# Patient Record
Sex: MALE | Race: BLACK OR AFRICAN AMERICAN | Employment: UNEMPLOYED | URBAN - METROPOLITAN AREA
[De-identification: names, ages, dates, MRNs, and addresses within clinical notes are randomized per-mention and may not be internally consistent; named-entity substitution may affect disease eponyms.]

---

## 2024-04-02 ENCOUNTER — HOSPITAL ENCOUNTER (EMERGENCY)
Age: 29
Discharge: HOME | End: 2024-04-02

## 2024-04-02 VITALS
HEART RATE: 75 BPM | DIASTOLIC BLOOD PRESSURE: 78 MMHG | RESPIRATION RATE: 16 BRPM | WEIGHT: 143.96 LBS | OXYGEN SATURATION: 96 % | SYSTOLIC BLOOD PRESSURE: 106 MMHG | TEMPERATURE: 98 F

## 2024-04-02 DIAGNOSIS — T16.2XXA FOREIGN BODY OF LEFT EAR, INITIAL ENCOUNTER: Primary | ICD-10-CM

## 2024-04-02 PROCEDURE — 99283 EMERGENCY DEPT VISIT LOW MDM: CPT

## 2024-04-02 PROCEDURE — 69200 CLEAR OUTER EAR CANAL: CPT

## 2024-04-02 RX ORDER — OFLOXACIN 3 MG/ML
5 SOLUTION AURICULAR (OTIC) 2 TIMES DAILY
Qty: 5 ML | Refills: 0 | Status: SHIPPED | OUTPATIENT
Start: 2024-04-02 | End: 2024-04-12

## 2024-04-02 RX ORDER — CIPROFLOXACIN AND DEXAMETHASONE 3; 1 MG/ML; MG/ML
4 SUSPENSION/ DROPS AURICULAR (OTIC) 2 TIMES DAILY
Qty: 7.5 ML | Refills: 0 | Status: SHIPPED | OUTPATIENT
Start: 2024-04-02 | End: 2024-04-02 | Stop reason: ALTCHOICE

## 2024-04-02 ASSESSMENT — PAIN SCALES - GENERAL
PAINLEVEL_OUTOF10: 0
PAINLEVEL_OUTOF10: 0

## 2024-04-02 ASSESSMENT — PAIN - FUNCTIONAL ASSESSMENT
PAIN_FUNCTIONAL_ASSESSMENT: NONE - DENIES PAIN
PAIN_FUNCTIONAL_ASSESSMENT: 0-10

## 2024-04-02 NOTE — DISCHARGE INSTRUCTIONS
You still have a piece of paper left in your ear. Follow up with ENT doctor closely for follow and further treatment. Use antibiotics as directed.

## 2024-04-02 NOTE — ED PROVIDER NOTES
[facility] EMERGENCY DEPARTMENT  EMERGENCY DEPARTMENT ENCOUNTER        Pt Name: Chad Payne  MRN: [id number]  Birthdate 1995  Date of evaluation: 4/2/2024  Provider: Jessica Williamson PA-C  PCP: No primary care provider on file.  Note Started: 6:40 PM EDT 4/2/24      ELIER. I have evaluated this patient.        CHIEF COMPLAINT       Chief Complaint   Patient presents with    Foreign Body in Ear     Reports he was trying to clean his ear with piece of paper and it got stuck in his left ear. No pain just wants it removed.       HISTORY OF PRESENT ILLNESS: 1 or more Elements     History From: patient  Limitations to history : None    Chad Payne is a 29 y.o. male who presents to the emergency department by private vehicle complaining of foreign body in left ear.  Patient was try to clear his ear out with a piece of paper.  Patient states paper got stuck in his ear canal.  He is here for removal.  He denies any associated pain.  Patient states prior to arrival he did try to rinse ear out with water without success.     Nursing Notes were all reviewed and agreed with or any disagreements were addressed in the HPI.    REVIEW OF SYSTEMS :      Review of Systems    Positives and Pertinent negatives as per HPI.     SURGICAL HISTORY   No past surgical history on file.    CURRENTMEDICATIONS       Previous Medications    No medications on file       ALLERGIES     Patient has no known allergies.    FAMILYHISTORY     No family history on file.     SOCIAL HISTORY          SCREENINGS        [place] Coma Scale  Eye Opening: Spontaneous  Best Verbal Response: Oriented  Best Motor Response: Obeys commands  [place] Coma Scale Score: 15                CIWA Assessment  BP: 108/69  Pulse: 79           PHYSICAL EXAM  1 or more Elements     ED Triage Vitals   BP Temp Temp Source Pulse Respirations SpO2 Height Weight - Scale   04/02/24 1811 04/02/24 1810 04/02/24 1811 04/02/24 1810 04/02/24 1810 04/02/24 1810 --